# Patient Record
(demographics unavailable — no encounter records)

---

## 2024-11-01 NOTE — END OF VISIT
[FreeTextEntry3] : I, Temi Montes, acted as a scribe on behalf of Dr. Amina Enamorado M.D. on 11/01/2024.   All medical entries made by the scribe were at my, Dr. Amina Enamorado M.D., direction and personally dictated by me on 11/01/2024. I have reviewed the chart and agree that the record accurately reflects my personal performance of the history, physical exam, assessment and plan. I have also personally directed, reviewed, and agreed with the chart.

## 2024-11-01 NOTE — PLAN
[FreeTextEntry1] : 38 yr old presents for an annual.   PLAN - Pap done - Given resources/referrals - RTO 1 yr Amina Enamorado MD

## 2024-11-01 NOTE — HISTORY OF PRESENT ILLNESS
I called LM for the pt to discuss the below.      [FreeTextEntry1] : 38 yr old presents for an annual. Pt complains of stomach pain when eating junk foods. Pt is still breast feeding and trying to wean off.   Pt's spouse passed away recently and is grieving. Pt is seeing a therapist for herself and states seeing monthly, but planning on increasing it. t requests a psychologist geared towards talking to son about process  denies any gyn complaints today tearful, office brings back memories of  denies and SI/HI, states she has physical support at home  Susana spoke to pt, gave resources